# Patient Record
Sex: FEMALE | URBAN - METROPOLITAN AREA
[De-identification: names, ages, dates, MRNs, and addresses within clinical notes are randomized per-mention and may not be internally consistent; named-entity substitution may affect disease eponyms.]

---

## 2021-10-21 ENCOUNTER — PROCEDURE VISIT (OUTPATIENT)
Dept: SPORTS MEDICINE | Age: 21
End: 2021-10-21

## 2021-10-21 DIAGNOSIS — M79.672 ACUTE FOOT PAIN, LEFT: Primary | ICD-10-CM

## 2021-10-21 NOTE — PROGRESS NOTES
Athletic Training  Date of Report: 10/21/2021  Name: Helen Botello: [facility]  Sport: Basketball  : 2000  Age: 24 y.o. MRN: <[id number]>  Encounter:  [x] New AT Eval     [] Follow-Up Visit    [] Other:   SUBJECTIVE:  Reason for Visit:    Chief Complaint   Patient presents with   Liliya Miller is a 24y.o. year old, female who presents today for evaluation of athletic injury involving left foot. Onset of the injury began a few days ago and injury occurred during half-marathon. Current pain and symptoms include: sharp and throbbing. Current level of pain is a 6. Symptoms have been intermittent and worsening since that time. Symptoms improve with rest, ice and medication: NSAIDs. Symptoms worsen with walking, running and stair climbing. The foot has not given out or felt unstable. Associated sounds or feelings at time of injury included: none. Treatment to date has included: ice, medication: NSAIDs and rest. Treatment has been somewhat helpful. Previous history of injury involving left foot, includes: Metatarsal 5th Stress Reaction. Suzi Eisenmenger comes in today for an evaluation of her L foot pain. She ran a half-marathon on  and after she finished she noticed she was having L medial foot pain. She had not been experiencing any pain in her foot when she was training for the race. She was icing regularly after the race and the pain in her foot was mild. She did have swelling initially but that has gone away due to her consistent icing. However, after basketball practice last night and walking to class all day the pain in her foot has increased. She now has a sharp pain in her foot when walking and then a throbbing pain in the foot once she stops walking. She can tell that she is walking more on the outside of her L foot to avoid pain. She has a history of stress fractures in her L 5th metatarsal and her R shin.  She currently is unable to play basketball due to the pain when running. OBJECTIVE:   Physical Exam  Vital Signs:   [x] There were no vitals taken for this visit  Date/Time Taken         Blood Pressure         Pulse          Constitution:   Appearance: Mary Ann Villalpando is [x] alert, [x] appears stated age, and [x] in no distress. Mary Ann Villalpando general body habitus is:    [] Cachectic [] Thin [x] Normal [] Obese [] Morbidly Obese  Pulmonary: Rate   [] Fast [x] Normal [] Slow    Rhythm  [x] Regular [] Irregular   Volume [x] Adequate  [] Shallow [] Deep  Effort  [] Labored [x] Unlabored  Skin:  Color  [x] Normal [] Pale [] Cyanotic    Temperature [] Hot   [x] Warm [] Cool  [] Cold     Moisture [] Dry  [x] Moist [] Warm    Psychiatric:   [x] Good judgement and insight. [x] Oriented to [x] person, [x] place, and [x] time. [x] Mood appropriate for circumstances.   Gait & Station:   Gait:    [x] Normal  [] Antalgic  [] Trendelenburg  [] Steppage  [] Wide  [] Unsteady   Foot:   [x] Neutral  [] Pronated  [] Supinated  Foot Type:  [] Neutral  [x] Pes Planus  [] Pes Cavus  Assistive Device: [x] None  [] Brace  [] Cane  [] Crutches  [] Druscilla Covert  [] Wheelchair  [] Other:   Inspection:   Skin:   [x] Intact [] Abrasion  [] Laceration  Notes:   Ecchymosis:  [] None [x] Mild  [] Moderate  [] Severe  Notes:   Atrophy:  [x] None [] Mild  [] Moderate  [] Severe  Notes:   Effusion:  [x] None [] Mild  [] Moderate  [] Severe  Notes:   Deformity:  [x] None [] Mild  [] Moderate  [] Severe  Notes:   Scar / Surgical incision(s): [] A-Scope Portals  [] Open Surgical Incision(s)  Notes:   Joint Hypertrophy:  Notes:   Alignment:   [x] Alignment was not assessed   Normal Measured Findings/Notes Passively Correctable to Normal   Forefoot Varus []  []   Forefoot Valgus []  []   Rearfoot Varus []  []   Rearfoot Valgus []  []   NWB Foot Alignment []  []   NWB Talar Position []  []    []  []   Orthopaedic Exam: Left Foot  Palpation:   Tenderness:  [] None  [x] Mild [] Moderate [] Severe   at: Cuneiform medial and Metatarsal 1st  Crepitation: [x] None  [] Mild [] Moderate [] Severe   at: Cuneiform medial, Metatarsal 1st, Metatarsal 1st Head, MTP 1st Joint and Plantar Fascia  Effusion: [x] None  [] Mild [] Moderate [] Severe   at: N/A  Posterior Pedal Pulse:  [x] Not assessed [] Not Detected [] Detected  Dorsalis Pedal Pulse: [x] Not assessed [] Not Detected [] Detected  Deformity:  None  Range of Motion: (Not assessed if not marked)  [x] Normal Flexibility / Mobility   ROM WNL PROM AROM OP Comments     L R L R L R    Great Toe Flexion [x]          Great Toe Extension [x]          Rays 2nd-5th Flexion [x]          Rays 2nd-5th Extension [x]          Subtalar Joint []               Inversion [x]               Eversion [x]          Talocrural Joint []               Dorsiflexion [x]               Plantarflexion [x]           []          Manual Muscle Test: (Not assessed if not marked)  [x] Normal Strength  MMT Left Right Comment   Great Toe Flexion 5 5 Mild pain on L   Great Toe Extension 5 5 Mild pain on L   Rays 2nd-5th Flexion 5 5    Rays 2nd-5th Extension 5 5    Subtalar Joint           Inversion 5 5 Mild pain on L        Eversion 5 5    Talocrural Joint           Dorsiflexion 5 5 Mild pain on L        Plantarflexion 5 5 Mild pain on L         Provocative Tests: (Not tested if not marked)   Negative Positive Positive Findings   Fracture      Bump [] []    Squeeze [x] []    Compression [x] []    Axial Load [] []    Tuning Fork [] []    Stability      MTP Joint Valgus Stress [x] []    MTP Joint Varus Stress [x] []    IP Joint Valgus Stress [x] []    IP Joint Varus Stress [x] []    Anterior Drawer [x] []    Inversion Talar Tilt [x] []    Eversion Talar Tilt [x] []    Posterior Drawer [x] []    Mobility      Intermetatarsal Glide [x] []    Tarsometatarsal Joint Glide [] []    Midtarsal Joint Glide [] []    First Ray Mobility [x] []    Syndesmosis       Kleiger's [x] []    Tibiofibular Stress Test [] [] Swing Test [] []    Tendon Pathology       Heladio Dobbser [x] []    Sub-lux Peroneal [] []    Impingement [] []    Too Many Toes [] []    Arch Pathology      Navicular Drop Test [] []    Tarsal Twist [] []    Feiss Line [] []    Neurovascular      Anterior Compartment Syndrome [] []    Peroneal Nerve [] []    Sciatic Nerve [] []    Lumbar Nerve [] []    Aura's Sign [] []    Dsouza's Neuroma [] []    Tinel's Sign [x] []    Miscellaneous       [] []     [] []    Reflex / Motor Function:  Gross motor weakness of hip:  [x] None [] Mild  [] Moderate [] Severe  Notes:   Gross motor weakness of knee: [x] None [] Mild  [] Moderate [] Severe  Notes:   Gross motor weakness of ankle: [x] None [] Mild  [] Moderate [] Severe  Notes:   Gross motor weakness of great toe: [x] None [] Mild  [] Moderate [] Severe  Notes:   Sensory / Neurologic Function:  [x] Sensation to light touch intact    [] Impaired:   [x] Deep tendon reflexes intact    [] Impaired:   [x] Coordination / proprioception intact  [] Impaired:   Contralateral Foot:  [x] Normal ROM and function with no pain. ASSESSMENT:   Diagnosis Orders   1. Acute foot pain, left       Clinical Impression: Metatarsal 1st Stress Reaction  Status: No Participation  Est. Time Missed: 3-7 Days  PLAN:  Treatment:  [x] Rest  [x] Ice   [] Wrap  [] Elevate  [] Tape  [] First Aid/Wound [] Moist Heat  [] Crutches  [] Brace  [] Splint  [] Sling  [] Immobilizer   [] Whirlpool  [] Massage  [] Pneumatic  [x] Rehab/Exercise  [x] Other:  Walking boot  Guardian Contacted: No  Comments / Instructions: Carlee Hernández has been having sharp pain over her 1st metatarsal for several days now. She is worried she may have a stress fx due to a previous history of stress fx. She is going to get x-rays and a walking boot so she can stop favoring the outside of her foot. Follow-up after going to urgent care. Follow-Up Care / Instructions:   HEP Information: Continue to ice and take an NSAIDs. Discharged: Yes  Electronically Signed By: Girish Vizcarra, ATC, LAT